# Patient Record
Sex: MALE | Race: BLACK OR AFRICAN AMERICAN | NOT HISPANIC OR LATINO
[De-identification: names, ages, dates, MRNs, and addresses within clinical notes are randomized per-mention and may not be internally consistent; named-entity substitution may affect disease eponyms.]

---

## 2020-01-22 PROBLEM — Z00.00 ENCOUNTER FOR PREVENTIVE HEALTH EXAMINATION: Status: ACTIVE | Noted: 2020-01-22

## 2020-01-27 ENCOUNTER — APPOINTMENT (OUTPATIENT)
Dept: PAIN MANAGEMENT | Facility: CLINIC | Age: 50
End: 2020-01-27
Payer: MEDICAID

## 2020-01-27 VITALS
BODY MASS INDEX: 19.85 KG/M2 | SYSTOLIC BLOOD PRESSURE: 118 MMHG | HEIGHT: 69 IN | DIASTOLIC BLOOD PRESSURE: 82 MMHG | WEIGHT: 134 LBS

## 2020-01-27 DIAGNOSIS — Z87.39 PERSONAL HISTORY OF OTHER DISEASES OF THE MUSCULOSKELETAL SYSTEM AND CONNECTIVE TISSUE: ICD-10-CM

## 2020-01-27 DIAGNOSIS — M54.16 RADICULOPATHY, LUMBAR REGION: ICD-10-CM

## 2020-01-27 DIAGNOSIS — M79.10 MYALGIA, UNSPECIFIED SITE: ICD-10-CM

## 2020-01-27 DIAGNOSIS — F17.200 NICOTINE DEPENDENCE, UNSPECIFIED, UNCOMPLICATED: ICD-10-CM

## 2020-01-27 DIAGNOSIS — M47.24 OTHER SPONDYLOSIS WITH RADICULOPATHY, THORACIC REGION: ICD-10-CM

## 2020-01-27 PROCEDURE — 99204 OFFICE O/P NEW MOD 45 MIN: CPT

## 2020-01-27 RX ORDER — OXYCODONE HYDROCHLORIDE 30 MG/1
TABLET ORAL
Refills: 0 | Status: ACTIVE | COMMUNITY

## 2020-01-27 NOTE — DATA REVIEWED
[FreeTextEntry1] : MRI Lumbar Spine (01/24/20):\par \par L3-L4: Left foraminal disc herniation causing stenosis of the left foramen.\par L4-L5: Peroneal disc bulge causing left greater than right foraminal stenosis.\par \par No other significant disc herniation or spinal stenosis\par \par XR Lumbar Spine (01/13/20):\par \par Discontinuity of the transverse process of L5 on the left side suspicious for a fracture. Mild narrowing of the L5-S1 interspace. Facet arthrosis. No dislocation or destructive process.\par
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
## 2020-01-27 NOTE — REVIEW OF SYSTEMS
[Joint Pain] : joint pain [Joint Stiffness] : joint stiffness [Numbness] : numbness [Negative] : Heme/Lymph [de-identified] : weakness

## 2020-01-27 NOTE — PHYSICAL EXAM
[de-identified] : Constitutional: Well-developed, in no acute distress\par Eyes: Pupil- Right: normal, Left: normal\par Neck exam: Inspection normal\par Respiratory: Normal effort, speaking in full sentences\par Cardiovascular: Regular rate and rhythm\par Vascular: Dorsal pedis pulses normal and equal bilaterally\par Abdomen: Inspection normal, no distension\par Skin: Inspection normal, no bruising noted\par Musculoskeletal:\par Lumbar Spine:   Gait: Antalgic\par 		Inspection: Normal curvature, no abnormal kyphosis or scoliosis\par 		Facet loading: pain bilaterally\par 		Palpation:\par 			Lumbar and paraspinal muscles: pain bilaterally\par 			Sacroiliac joint: no pain\par 			Greater trochanter: no pain\par 		Muscle Strength:\par 		Iliopsoas: 5/5 bilaterally\par 		Quadriceps: 5/5 bilaterally\par 		Hamstrings: 5/5 bilaterally\par 		Tibialis anterior: 5/5 bilaterally\par 		Extensor hallucis longus: 5/5 bilaterally\par \par 		Sensation: normal and equal in bilateral lower extremities\par \par 		Reflexes:\par 			Patellar reflex: 2+ bilaterally\par 			Ankle jerk reflex: 2+ bilaterally\par 		\par 		Straight leg raise: negative bilaterally\par \par Extremity: no edema noted\par Neurological: Memory normal, AAO x 3, Cranial nerves II - XII grossly normal\par Psychiatric: Appropriate mood and affect, oriented to time, place, person, and situation\par \par \par

## 2020-01-27 NOTE — CONSULT LETTER
[Dear  ___] : Dear  [unfilled], [Consult Letter:] : I had the pleasure of evaluating your patient, [unfilled]. [Please see my note below.] : Please see my note below. [Consult Closing:] : Thank you very much for allowing me to participate in the care of this patient.  If you have any questions, please do not hesitate to contact me. [Sincerely,] : Sincerely, [FreeTextEntry3] : Kamar Ferrara MD\par

## 2020-01-27 NOTE — ASSESSMENT
[FreeTextEntry1] : 49 yom on high dose opioid therapy presents w/ severe onset back pain. I have personally reviewed the patient's MRI in detail and discussed it with them which is significant for no acute fracture or high level stenosis. Given the severity of his right-sided pain which radiates around the ribs, recommend MRI thoracic spine to evaluate his thoracic radiculopathy. Patient will return to review imaging and plantar potential intervention, likely epidural steroid injection. Risks of opioids discussed in detail with the patient.